# Patient Record
Sex: FEMALE | Race: BLACK OR AFRICAN AMERICAN | Employment: UNEMPLOYED | URBAN - METROPOLITAN AREA
[De-identification: names, ages, dates, MRNs, and addresses within clinical notes are randomized per-mention and may not be internally consistent; named-entity substitution may affect disease eponyms.]

---

## 2021-06-14 ENCOUNTER — HOSPITAL ENCOUNTER (OUTPATIENT)
Dept: PHYSICAL THERAPY | Age: 61
Discharge: HOME | End: 2021-06-14
Payer: MEDICAID

## 2021-06-14 PROCEDURE — 97161 PT EVAL LOW COMPLEX 20 MIN: CPT

## 2021-06-14 NOTE — PROGRESS NOTES
PHYSICAL THERAPY - DAILY TREATMENT NOTE     Patient Name: Donita Nick        Date: 2021  : 1960   YES Patient  Verified  Visit #:     Insurance: Payor: Aleksandr Florentino / Plan: [facility] COMMUNITY PLAN [facility] CCCP / Product Type: Managed Care Medicaid /      In time: 9:50 AM Out time: 10:30 AM   Total Treatment Time: 40 min     Medicare/BCBS Time Tracking (below)   Total Timed Codes (min):  NA 1:1 Treatment Time:  NA     TREATMENT AREA =  Left shoulder pain [M25.512]    SUBJECTIVE    Pain Level (on 0 to 10 scale):  1  / 10   Medication Changes/New allergies or changes in medical history, any new surgeries or procedures? NO    If yes, update Summary List   Subjective Functional Status/Changes:  []  No changes reported     See POC         OBJECTIVE  Modalities Rationale:     decrease inflammation, decrease pain and increase tissue extensibility to improve patient's ability toreach, perform self care ADL, household ADLs, and sleep with less pain.    min [] Estim, type/location:                                      []  att     []  unatt     []  w/US     []  w/ice    []  w/heat    min []  Mechanical Traction: type/lbs                   []  pro   []  sup   []  int   []  cont    []  before manual    []  after manual    min []  Ultrasound, settings/location:      min []  Iontophoresis w/ dexamethasone, location:                                               []  take home patch       []  in clinic   10 min []  Ice     [x]  Heat    location/position: Left shoulder/seated with L UE supported    min []  Vasopneumatic Device, press/temp:     min []  Other:    [x] Skin assessment post-treatment (if applicable):    [x]  intact    []  redness- no adverse reaction     []redness - adverse reaction:      12 min Therapeutic Exercise:  [x]  See flow sheet   Rationale:      increase ROM and increase proprioception to improve the patients ability to reach, perform self care ADL, household ADLs, and sleep with less pain.     Billed With/As:   [x] TE   [] TA   [] Neuro   [] Self Care Patient Education: [x] Review HEP    [] Progressed/Changed HEP based on:   [] positioning   [] body mechanics   [] transfers   [] heat/ice application    [] other:        Other Objective/Functional Measures:    See POC     Post Treatment Pain Level (on 0 to 10) scale:   4 / 10     ASSESSMENT    Assessment/Changes in Function:     See POC     []  See Progress Note/Recertification   Patient will continue to benefit from skilled PT services to modify and progress therapeutic interventions, address functional mobility deficits, address ROM deficits, address strength deficits, analyze and address soft tissue restrictions, analyze and cue movement patterns, analyze and modify body mechanics/ergonomics and assess and modify postural abnormalities to attain remaining goals.       Progress toward goals / Updated goals:    See POC       PLAN    [x]  Upgrade activities as tolerated YES Continue plan of care   []  Discharge due to :    []  Other:      Therapist: Blanquita Whitehead PT    Date: 6/14/2021 Time: 11:14 AM     Future Appointments   Date Time Provider Nicola Crockett   6/16/2021  9:45 AM Laureen Zabala PT [place] [id number] [address]   6/21/2021  9:45 AM Laureen Zabala PT [place] [id number] [address]   6/23/2021  9:45 AM Laureen Zabala PT [address]

## 2021-06-14 NOTE — PROGRESS NOTES
[address] [facility] PHYSICAL THERAPY AT [facility] [address] [place], [address] - Phone: [phone]  Fax: [phone] / [address] [facility]  Patient Name: Burgess Rhodes : 1960   Medical   Diagnosis: Left shoulder pain [M25.512] Treatment Diagnosis: Left shoulder pain, decreased L shldr ROM, myofascial pain   Onset Date: About 2 months ago     Referral Source: Daniel Johnson MD [facility]): 2021   Prior Hospitalization: See medical history Provider #: [id number]   Prior Level of Function: Unrestricted pain-free use of left UE for ADLs   Comorbidities: HTN, Hyperlipidemia, COPD, emphysema, CAD   Medications: Verified on Patient Summary List   The Plan of Care and following information is based on the information from the initial evaluation.   ===================================================================  Assessment / key information:  Pt is a 64 yo right handed female who presents with signs and symptoms consistent with left shoulder pain and associated decreased ROM and myofascial tenderness; possible idiopathic frozen shoulder. SIVAN/HPI: Pt reports left shoulder pain after getting COVID vaccine in L shldr about 2 months ago and painful since. Pt reports no deficits in L UE prior to vaccination and no known injury or other precipitating event/circumstances. Pt denies crepitus. Pt reports location of pain left shoulder and upper arm, left scapular area, and left pectoral area, nature of pain aching and sore with tenderness to  palpation, current pain level 1/10, pain level at worst 9/10, and pain level at best 1/10. Aggravating factors: movement, use. Relieving factors: Advil, heat. PMHx/Falls: No falls. Pt on O2 at home and use of portable O2 concentrator for out of home use. 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 CLoF: needs assistance for household chores and hair care.  Not driving. Objective:   Posture: WFL. Shoulder AROM (standing)                    MMT                                           Right Left  Right Left   Flexion  68 p! Flexion  NT   Extension  38 p! Extension  NT   ER @ 0 Degrees  NT ER  NT   Scaption/ABd  52 p! Scaption  NT   IR behind back  (C7 to thumb)  Finger tips to left UT area, p! IR  NT   ER behind head  Finger tips to left UT, p!   WFL and OK   UE Cross Body Reach:  Left finger tips to right biceps, p!; WFL and OK with right UE    Distal UE AROM in standing:  WFL and OK left fingers/thumb and wrist; WFL and OK left forearm pronation; mildly limited left forearm supination with shoulder pain; full left elbow flexion and extension. Scapular AROM in standing:  Approximately full bilat for elevation/shoulder shrug with left upper arm pain; full bilat for retraction with some left upper back pain; mildly decreased left and full right for protraction with left shoulder pain    Cervical AROM in standing:  WFL and OK for flex and ext; [facility] and OK for left rotation; mildly decreased for right rotation and right neck/UT pain; full side bending to R and OK; nearly full side bending to L and left neck/UT pain. Palpation:  Tenderness and some increased muscle tonicity at left UT/levator, left MT/rhomboid, left LT and latissimus and teres, left deltoid, left bicpes and triceps, and left upper pectoral area.     Pt would benefit from skilled PT intervention in order to improve upon previously mentioned subjective/objective impairments. ===========================================================================================  Eval Complexity: History HIGH Complexity :3+ comorbidities / personal factors will impact the outcome/ POC ;  Examination  LOW Complexity : 1-2 Standardized tests and measures addressing body structure, function, activity limitation and / or participation in recreation ; Presentation MEDIUM Complexity : Evolving with changing characteristics ; Decision Making MEDIUM Complexity : FOTO score of 26-74; Overall Complexity LOW   Problem List: pain affecting function, decrease ROM, decrease ADL/ functional abilitiies, decrease activity tolerance and decrease flexibility/ joint mobility   Treatment Plan may include any combination of the following: Therapeutic exercise, Therapeutic activities, Neuromuscular re-education, Physical agent/modality, Manual therapy, Patient education and Self Care training  Patient / Family readiness to learn indicated by: asking questions and interest  Persons(s) to be included in education: patient (P)  Barriers to Learning/Limitations: None  Measures taken, if barriers to learning    Patient Goal (s): Decrease/resolve left shoulder pain, improve motion   Patient self reported health status: fair  Rehabilitation Potential: fair   Short Term Goals: To be accomplished in  3-4  weeks:  1. Establish initial HEP and pt compliant with instructions. 2. Decrease max pain scale rating by >/= 2-3 points. 3. Increase left scapular AROM and forearm supination AROM to full with no/minimal discomfort. 4. No/minimal discomfort or limitation with cervical AROM for right rotation and right SB.  5. Increase L shldr AROM for flex, aBd, and ext in standing by >/= 10-15 degrees. 6. Increase L UE reach AROM to finger tips to >/= R posterior deltoid and C7; thumb tip to >/= L1.     Long Term Goals: To be accomplished in  6-8  weeks:  1. Increase FOTO score to > 66 indicating improved function. 2. Decrease max pain scale rating to </= 2-3/10.  3. Increase L shldr PROM to within 10 degrees of R.  4. Increase L shldr AROM in standing to within 5-10 degrees of R.  5. Increase L UE reaching AROM to within 1-2 inches of R.  6. Minimal tenderness to palpation. 7. Increase L shoulder and elbow MMT scores to within 1/3 grade of R and minimal discomfort with resistive testing.     Frequency / Duration:   Patient to be seen  2  times per week for 6-8  weeks:  Patient / Caregiver education and instruction: self care, activity modification, and exercises  Therapist Signature: Crystal Blandon PT Date: 6/23/2398   Certification Period: 30 days Time: 11:19 AM   ===========================================================================================    To ensure your patient receives the highest quality care and to avoid disruption in therapy please sign and return this plan of care within 21 days. Per Medicaid guidelines if the plan of care is not received within 21 days the patient's care must be put on hold until signed. I certify that the above Physical Therapy Services are being furnished while the patient is under my care. I agree with the treatment plan and certify that this therapy is necessary. Physician Signature:      Date:       Time:     Delvis Elder MD      Please sign and return to [facility] or you may fax the signed copy to [phone]. Thank you.

## 2021-06-23 ENCOUNTER — HOSPITAL ENCOUNTER (OUTPATIENT)
Dept: PHYSICAL THERAPY | Age: 61
End: 2021-06-23
Payer: MEDICAID

## 2021-07-20 NOTE — PROGRESS NOTES
[address] [facility] PHYSICAL THERAPY AT [facility] [address] [place], [address]  Phone: [phone]  Fax: [phone] SUMMARY  Patient Name: Alexus Hussein : 1960   Treatment/Medical Diagnosis: Left shoulder pain [M25.512]   Referral Source: Jeremy Robertson MD     Date of Initial Visit: 2021 Attended Visits: 1 Missed Visits: 3     SUMMARY OF TREATMENT    Initial PT Evaluation and instruction in beginning HEP, with issuance of handouts with pictures and written directions. CURRENT STATUS   Unknown, as patient did not return for several scheduled follow-up PT sessions. Telephone message was left on answering machine/service at patient's contact number on 21, but no return call received back from patient in response. Previous Goals: · Short Term Goals: To be accomplished in  3-4  weeks:  1. Establish initial HEP and pt compliant with instructions--HEP established, but unknown compliance due to no further attendance. 2. Decrease max pain scale rating by >/= 2-3 points--unknown due to no further attendance. 3. Increase left scapular AROM and forearm supination AROM to full with no/minimal discomfort--unknown due to no further attendance. 4. No/minimal discomfort or limitation with cervical AROM for right rotation and right SB--unknown due to no further attendance. 5. Increase L shldr AROM for flex, aBd, and ext in standing by >/= 10-15 degrees--unknown due to no further attendance. 6. Increase L UE reach AROM to finger tips to >/= R posterior deltoid and C7; thumb tip to >/= L1--unknown due to no further attendance. · Long Term Goals: To be accomplished in  6-8  weeks:  1. Increase FOTO score to > 66 indicating improved function--unknown due to no further attendance. 2. Decrease max pain scale rating to </= 2-3/10--unknown due to no further attendance.   3. Increase L shldr PROM to within 10 degrees of R--unknown due to no further attendance. 4. Increase L shldr AROM in standing to within 5-10 degrees of R--unknown due to no further attendance. 5. Increase L UE reaching AROM to within 1-2 inches of R--unknown due to no further attendance. 6. Minimal tenderness to palpation--unknown due to no further attendance. 7. Increase L shoulder and elbow MMT scores to within 1/3 grade of R and minimal discomfort with resistive testing--unknown due to no further attendance. RECOMMENDATIONS  Discontinue therapy due to lack of attendance or compliance. If you have any questions/comments please contact us directly at [phone]. Thank you for allowing us to assist in the care of your patient. Therapist Signature: Elaina Anderson PT Date: 07/20/2021   Reporting Period:   06/14/2021 to 07/20/2021 Time: 2:49 PM     To ensure we are able to process the patients encounter and avoid risk of your patient receiving a bill for our services, please sign and return this discharge summary to [facility] at [phone] by 08/19/2021 (30 days following DC date).       Physician signature:__________________________   Date: _________                                                                                           Time:__________

## 2022-05-04 ENCOUNTER — CLINICAL SUPPORT (OUTPATIENT)
Dept: SURGERY | Age: 62
End: 2022-05-04

## 2022-05-04 VITALS
RESPIRATION RATE: 20 BRPM | WEIGHT: 120 LBS | BODY MASS INDEX: 19.29 KG/M2 | HEIGHT: 66 IN | OXYGEN SATURATION: 99 % | TEMPERATURE: 97.8 F | HEART RATE: 63 BPM

## 2022-05-04 DIAGNOSIS — Z83.71 FAMILY HISTORY OF COLONIC POLYPS: ICD-10-CM

## 2022-05-04 DIAGNOSIS — Z12.11 COLON CANCER SCREENING: ICD-10-CM

## 2022-05-04 DIAGNOSIS — Z01.818 PRE-OP TESTING: Primary | ICD-10-CM

## 2022-05-04 RX ORDER — ALBUTEROL SULFATE 90 UG/1
AEROSOL, METERED RESPIRATORY (INHALATION)
COMMUNITY

## 2022-05-04 RX ORDER — MIRTAZAPINE 15 MG/1
15 TABLET, FILM COATED ORAL
COMMUNITY

## 2022-05-04 RX ORDER — SIMVASTATIN 20 MG/1
TABLET, FILM COATED ORAL
COMMUNITY

## 2022-05-04 RX ORDER — ARFORMOTEROL TARTRATE 15 UG/2ML
SOLUTION RESPIRATORY (INHALATION)
COMMUNITY

## 2022-05-04 RX ORDER — FLUTICASONE FUROATE, UMECLIDINIUM BROMIDE AND VILANTEROL TRIFENATATE 100; 62.5; 25 UG/1; UG/1; UG/1
POWDER RESPIRATORY (INHALATION)
COMMUNITY
Start: 2021-11-16

## 2022-05-04 RX ORDER — LISINOPRIL 20 MG/1
TABLET ORAL
COMMUNITY

## 2022-05-04 RX ORDER — OMEPRAZOLE 20 MG/1
20 TABLET, DELAYED RELEASE ORAL DAILY
COMMUNITY
Start: 2022-03-15

## 2022-05-04 RX ORDER — GUAIFENESIN 100 MG/5ML
81 LIQUID (ML) ORAL DAILY
COMMUNITY

## 2022-05-04 NOTE — PROGRESS NOTES
Colon Screen    Patient: Emigdio Andrade MRN: [id number]  SSN: [id number]    YOB: 1960  Age: 63 y.o. Sex: female        Subjective:   Emigdio Andrade was referred by her PCP, Nic Jaimes MD.  Patient referred for colonoscopy for   Screening colonoscopy. Patient denies rectal pain or bleeding. Abdominal surgeries as described below, specifically none. Family history as described below, specifically mother with colon polyps. Patient has never had a colonoscopy. She will need pulmonology clearance from Dr. Kadie Crockett. Allergies   Allergen Reactions    Pcn [Penicillins] Anaphylaxis       Past Medical History:   Diagnosis Date    Acute exacerbation of chronic obstructive pulmonary disease (COPD) ([facility] [address]     Acute respiratory distress     Acute respiratory failure (HCC)     Asthma exacerbation     Cardiomyopathy ([facility] [address]     Chronic respiratory failure with hypoxia (HCC)     Cocaine abuse (HCC)     COPD exacerbation (HCC)     FHx: SVT (supraventricular tachycardia)     Hemoptysis     History of stomach ulcers     Hypertension     Hypoxia     Respiratory distress     SOB (shortness of breath)      Past Surgical History:   Procedure Laterality Date    HX CYST REMOVAL        Family History   Problem Relation Age of Onset    Hypertension Mother     Colon Polyps Mother     Asthma Father     Hypertension Father     Diabetes Brother      Social History     Tobacco Use    Smoking status: Current Every Day Smoker     Packs/day: 2.00     Years: 40.00     Pack years: 80.00     Types: Cigarettes    Smokeless tobacco: Never Used   Substance Use Topics    Alcohol use: Not Currently      Prior to Admission medications    Medication Sig Start Date End Date Taking? Authorizing Provider   mirtazapine (REMERON) 15 mg tablet Take 15 mg by mouth nightly.    Yes Provider, Historical   lisinopriL (PRINIVIL, ZESTRIL) 20 mg tablet 1 tablet   Yes Provider, Historical   albuterol (Proventil HFA) 90 mcg/actuation inhaler 2 puffs as needed   Yes Provider, Historical   fluticasone-umeclidinium-vilanterol (Trelegy Ellipta) 100-62.5-25 mcg inhaler INHALE ONE (1) PUFF BY MOUTH ONCE DAILY 11/16/21  Yes Provider, Historical   arformoteroL (BROVANA) 15 mcg/2 mL nebu neb solution USE 1 VIAL IN NEBULIZER TWICE DAILY   Yes Provider, Historical   simvastatin (ZOCOR) 20 mg tablet 1 tablet in the evening   Yes Provider, Historical   omeprazole (PRILOSEC OTC) 20 mg tablet Take 20 mg by mouth daily. 3/15/22  Yes Provider, Historical   Oxygen 4 liters continous nasal canula   Yes Provider, Historical          Review of Systems:  Review of Systems   Constitutional: Positive for chills, diaphoresis, malaise/fatigue and weight loss. Negative for fever. HENT: Negative. Eyes: Positive for blurred vision and double vision. Negative for photophobia, pain, discharge and redness. Respiratory: Positive for shortness of breath and wheezing. Negative for cough, hemoptysis and sputum production. Cardiovascular: Positive for chest pain, palpitations and leg swelling. Negative for orthopnea, claudication and PND. Gastrointestinal: Positive for abdominal pain, constipation, heartburn, nausea and vomiting. Negative for blood in stool, diarrhea and melena. Genitourinary: Positive for dysuria and hematuria. Negative for flank pain, frequency and urgency. Musculoskeletal: Positive for back pain, joint pain and neck pain. Negative for falls and myalgias. Skin: Negative. Neurological: Positive for dizziness, tingling, tremors and headaches. Negative for sensory change, speech change, focal weakness, seizures, loss of consciousness and weakness. Endo/Heme/Allergies: Negative. Psychiatric/Behavioral: Negative. Risks colonoscopy described- colon injury, missed lesion, anesthesia problems, bleeding       Beatriz Sauceda, SUJEYN  May 4, 3415  18:29 AM

## 2022-08-05 ENCOUNTER — HOSPITAL ENCOUNTER (OUTPATIENT)
Dept: CT IMAGING | Age: 62
Discharge: HOME | End: 2022-08-05
Attending: PHYSICIAN ASSISTANT
Payer: MEDICAID

## 2022-08-05 DIAGNOSIS — R31.29 MICROHEMATURIA: ICD-10-CM

## 2022-08-05 LAB — CREAT UR-MCNC: 0.5 MG/DL (ref 0.6–1.3)

## 2022-08-05 PROCEDURE — 82565 ASSAY OF CREATININE: CPT

## 2022-08-05 PROCEDURE — 74011000636 HC RX REV CODE- 636: Performed by: PHYSICIAN ASSISTANT

## 2022-08-05 PROCEDURE — 74178 CT ABD&PLV WO CNTR FLWD CNTR: CPT

## 2022-08-05 RX ADMIN — IOPAMIDOL 100 ML: 612 INJECTION, SOLUTION INTRAVENOUS at 13:19
